# Patient Record
Sex: MALE | Race: WHITE | NOT HISPANIC OR LATINO | URBAN - METROPOLITAN AREA
[De-identification: names, ages, dates, MRNs, and addresses within clinical notes are randomized per-mention and may not be internally consistent; named-entity substitution may affect disease eponyms.]

---

## 2024-09-11 ENCOUNTER — APPOINTMENT (OUTPATIENT)
Dept: RADIOLOGY | Facility: IMAGING CENTER | Age: 44
End: 2024-09-11
Payer: OTHER GOVERNMENT

## 2024-09-11 ENCOUNTER — OFFICE VISIT (OUTPATIENT)
Dept: URGENT CARE | Facility: CLINIC | Age: 44
End: 2024-09-11
Payer: OTHER GOVERNMENT

## 2024-09-11 ENCOUNTER — HOSPITAL ENCOUNTER (OUTPATIENT)
Facility: MEDICAL CENTER | Age: 44
End: 2024-09-11
Payer: OTHER GOVERNMENT

## 2024-09-11 VITALS
HEART RATE: 76 BPM | HEIGHT: 73 IN | BODY MASS INDEX: 39.12 KG/M2 | OXYGEN SATURATION: 95 % | WEIGHT: 295.2 LBS | RESPIRATION RATE: 18 BRPM | SYSTOLIC BLOOD PRESSURE: 138 MMHG | TEMPERATURE: 97.3 F | DIASTOLIC BLOOD PRESSURE: 80 MMHG

## 2024-09-11 DIAGNOSIS — K59.00 CONSTIPATION, UNSPECIFIED CONSTIPATION TYPE: ICD-10-CM

## 2024-09-11 DIAGNOSIS — R35.0 URINARY FREQUENCY: ICD-10-CM

## 2024-09-11 DIAGNOSIS — Z87.442 HISTORY OF RENAL STONE: ICD-10-CM

## 2024-09-11 DIAGNOSIS — R31.9 HEMATURIA, UNSPECIFIED TYPE: ICD-10-CM

## 2024-09-11 PROBLEM — M25.639 STIFFNESS OF WRIST JOINT: Status: ACTIVE | Noted: 2024-09-11

## 2024-09-11 PROBLEM — M77.30 CALCANEAL SPUR: Status: ACTIVE | Noted: 2024-09-11

## 2024-09-11 PROBLEM — M54.6 PAIN IN THORACIC SPINE: Status: ACTIVE | Noted: 2024-09-11

## 2024-09-11 PROBLEM — K05.10 CHRONIC GINGIVITIS, PLAQUE INDUCED: Status: ACTIVE | Noted: 2024-09-11

## 2024-09-11 PROBLEM — R11.0 NAUSEA: Status: ACTIVE | Noted: 2024-09-11

## 2024-09-11 PROBLEM — K21.9 ESOPHAGEAL REFLUX: Status: ACTIVE | Noted: 2024-09-11

## 2024-09-11 PROBLEM — E78.5 HYPERLIPIDEMIA: Status: ACTIVE | Noted: 2018-12-20

## 2024-09-11 PROBLEM — M79.673 PAIN OF FOOT: Status: ACTIVE | Noted: 2024-09-11

## 2024-09-11 PROBLEM — M25.539 ARTHRALGIA OF WRIST: Status: ACTIVE | Noted: 2024-09-11

## 2024-09-11 PROBLEM — M72.2 PLANTAR FASCIITIS: Status: ACTIVE | Noted: 2024-09-11

## 2024-09-11 PROBLEM — M12.50 TRAUMATIC ARTHROPATHY: Status: ACTIVE | Noted: 2018-12-20

## 2024-09-11 PROBLEM — M22.2X9 PATELLOFEMORAL SYNDROME: Status: ACTIVE | Noted: 2024-09-11

## 2024-09-11 PROBLEM — R03.0 FINDING OF ABOVE NORMAL BLOOD PRESSURE: Status: ACTIVE | Noted: 2024-09-11

## 2024-09-11 PROBLEM — E66.9 OBESITY: Status: ACTIVE | Noted: 2018-12-20

## 2024-09-11 PROBLEM — M25.569 ARTHRALGIA OF KNEE: Status: ACTIVE | Noted: 2024-09-11

## 2024-09-11 PROBLEM — J30.9 ALLERGIC RHINITIS: Status: ACTIVE | Noted: 2018-12-20

## 2024-09-11 PROBLEM — G47.30 SLEEP APNEA: Status: ACTIVE | Noted: 2018-11-26

## 2024-09-11 PROBLEM — K21.9 GASTROESOPHAGEAL REFLUX DISEASE: Status: ACTIVE | Noted: 2018-11-26

## 2024-09-11 PROBLEM — M25.579 ARTHRALGIA OF ANKLE: Status: ACTIVE | Noted: 2024-09-11

## 2024-09-11 LAB
APPEARANCE UR: NORMAL
BILIRUB UR STRIP-MCNC: NEGATIVE MG/DL
COLOR UR AUTO: YELLOW
GLUCOSE UR STRIP.AUTO-MCNC: NEGATIVE MG/DL
KETONES UR STRIP.AUTO-MCNC: NEGATIVE MG/DL
LEUKOCYTE ESTERASE UR QL STRIP.AUTO: NEGATIVE
NITRITE UR QL STRIP.AUTO: NEGATIVE
PH UR STRIP.AUTO: 5.5 [PH] (ref 5–8)
PROT UR QL STRIP: NORMAL MG/DL
RBC UR QL AUTO: NORMAL
SP GR UR STRIP.AUTO: 1.03
UROBILINOGEN UR STRIP-MCNC: 0.2 MG/DL

## 2024-09-11 PROCEDURE — 81002 URINALYSIS NONAUTO W/O SCOPE: CPT

## 2024-09-11 PROCEDURE — 3079F DIAST BP 80-89 MM HG: CPT

## 2024-09-11 PROCEDURE — 74018 RADEX ABDOMEN 1 VIEW: CPT | Mod: TC,FY

## 2024-09-11 PROCEDURE — 99214 OFFICE O/P EST MOD 30 MIN: CPT

## 2024-09-11 PROCEDURE — 87086 URINE CULTURE/COLONY COUNT: CPT

## 2024-09-11 PROCEDURE — 3075F SYST BP GE 130 - 139MM HG: CPT

## 2024-09-12 DIAGNOSIS — R31.9 HEMATURIA, UNSPECIFIED TYPE: ICD-10-CM

## 2024-09-12 DIAGNOSIS — R35.0 URINARY FREQUENCY: ICD-10-CM

## 2024-09-12 NOTE — PROGRESS NOTES
"Subjective:   Josue Chapman is a 44 y.o. male who presents for Urinary Frequency (This morning urinary frequency, pressure. History of kidney infections.)      HPI:    Patient presents urgent care with concerns of sudden urinary frequency, suprapubic discomfort.  He reports history of kidney stones and feels this pain that he is feeling today is similar.  He states the pain starts in his perineal area and radiates up into his bladder.  He also reports history of kidney infections.  Denies dysuria, hematuria.  Denies fever, chills, body aches.  Denies concerns for STDs.    ROS As above in HPI    Medications:    Current Outpatient Medications on File Prior to Visit   Medication Sig Dispense Refill    NON SPECIFIED Nexium       No current facility-administered medications on file prior to visit.        Allergies:   Patient has no known allergies.    Problem List:   Patient Active Problem List   Diagnosis    Allergic rhinitis    Arthralgia of ankle    Arthralgia of wrist    Arthralgia of knee    Calcaneal spur    Chronic gingivitis, plaque induced    Esophageal reflux    Gastroesophageal reflux disease    Finding of above normal blood pressure    Hyperlipidemia    Lack of housing    Nausea    Obesity    Pain in thoracic spine    Pain of foot    Patellofemoral syndrome    Sleep apnea    Plantar fasciitis    Traumatic arthropathy    Stiffness of wrist joint        Surgical History:  No past surgical history on file.    Past Social Hx:   Social History     Tobacco Use    Smoking status: Never    Smokeless tobacco: Never   Vaping Use    Vaping status: Never Used   Substance Use Topics    Alcohol use: Yes     Comment: occ    Drug use: Never          Problem list, medications, and allergies reviewed by myself today in Epic.     Objective:     /80   Pulse 76   Temp 36.3 °C (97.3 °F) (Temporal)   Resp 18   Ht 1.854 m (6' 1\")   Wt (!) 134 kg (295 lb 3.2 oz)   SpO2 95%   BMI 38.95 kg/m²     Physical " Exam  Vitals and nursing note reviewed.   Constitutional:       General: He is not in acute distress.     Appearance: Normal appearance. He is not ill-appearing or diaphoretic.   HENT:      Head: Normocephalic and atraumatic.   Cardiovascular:      Rate and Rhythm: Normal rate and regular rhythm.      Heart sounds: Normal heart sounds.   Pulmonary:      Breath sounds: Normal breath sounds.   Abdominal:      General: Bowel sounds are normal. There is no distension.      Palpations: Abdomen is soft.      Tenderness: There is no abdominal tenderness. There is no right CVA tenderness, left CVA tenderness, guarding or rebound.   Genitourinary:     Penis: Circumcised. No erythema, tenderness, discharge or swelling.       Testes:         Right: Tenderness or swelling not present.         Left: Tenderness or swelling not present.      Epididymis:      Right: Normal.      Left: Normal.   Neurological:      Mental Status: He is alert.         Assessment/Plan:       Results for orders placed or performed in visit on 09/11/24   POCT Urinalysis   Result Value Ref Range    POC Color Yellow Negative    POC Appearance Slightly cloudy Negative    POC Glucose Negative Negative mg/dL    POC Bilirubin Negative Negative mg/dL    POC Ketones Negative Negative mg/dL    POC Specific Gravity 1.030 <1.005 - >1.030    POC Blood Large Negative    POC Urine PH 5.5 5.0 - 8.0    POC Protein Trace Negative mg/dL    POC Urobiligen 0.2 Negative (0.2) mg/dL    POC Nitrites Negative Negative    POC Leukocyte Esterase Negative Negative     [id number] VIEW 09/11/2024    Narrative  9/11/2024 5:30 PM    HISTORY/REASON FOR EXAM:  Hematuria; Urinary frequency, suprapubic pressure.      TECHNIQUE/EXAM DESCRIPTION AND NUMBER OF VIEWS:  1 view(s) of the abdomen.    COMPARISON: None    FINDINGS:  No free air under the diaphragm.  No dilated loops of bowel or air-fluid levels.  No suspicious calcifications.  No acute osseous abnormality.    Impression  Mild to  moderate stool. Nonobstructive bowel gas pattern.    Presumed hepatomegaly.      Diagnosis and associated orders:   1. Constipation, unspecified constipation type  - magnesium citrate Solution; Take 150 mL by mouth one time for 1 dose.  Dispense: 150 mL; Refill: 0    2. Urinary frequency  - POCT Urinalysis  - [id number] VIEW; Future  - URINE CULTURE(NEW); Future    3. History of renal stone  - [id number] VIEW; Future    4. Hematuria, unspecified type  - URINE CULTURE(NEW); Future    Other orders  - NON SPECIFIED; Nexium        Comments/MDM:     UA: +large blood, +protein, -LE, -nitrite  Urine culture ordered  KUB ordered to evaluate for renal stones. Per radiology impression, there s Mild to moderate stool. Nonobstructive bowel gas pattern. Images reviewed.   Recommend increased fluids, fiber, laxatives.   Return to UC for evaluation should symptoms persist.  Declined referral to PCP       Return to clinic or go to ED if symptoms worsen or persist. Indications for ED discussed at length. Patient/Parent/Guardian voices understanding. Follow-up with your primary care provider in 3-5 days. Red flag symptoms discussed. All side effects of medication discussed including allergic response, GI upset, tendon injury, rash, sedation etc.    Please note that this dictation was created using voice recognition software. I have made a reasonable attempt to correct obvious errors, but I expect that there are errors of grammar and possibly content that I did not discover before finalizing the note.    This note was electronically signed by KATHLEEN Prakash

## 2024-09-14 LAB
BACTERIA UR CULT: NORMAL
SIGNIFICANT IND 70042: NORMAL
SITE SITE: NORMAL
SOURCE SOURCE: NORMAL

## 2024-09-20 ENCOUNTER — NON-PROVIDER VISIT (OUTPATIENT)
Dept: URGENT CARE | Facility: CLINIC | Age: 44
End: 2024-09-20

## 2024-09-20 DIAGNOSIS — Z02.1 PRE-EMPLOYMENT DRUG SCREENING: Primary | ICD-10-CM

## 2024-09-20 LAB
AMP AMPHETAMINE: NORMAL
COC COCAINE: NORMAL
INT CON NEG: NEGATIVE
INT CON POS: POSITIVE
MET METHAMPHETAMINES: NORMAL
OPI OPIATES: NORMAL
PCP PHENCYCLIDINE: NORMAL
POC DRUG COMMENT 753798-OCCUPATIONAL HEALTH: NEGATIVE
THC: NORMAL

## 2024-09-20 PROCEDURE — 80305 DRUG TEST PRSMV DIR OPT OBS: CPT | Performed by: PHYSICIAN ASSISTANT
